# Patient Record
Sex: FEMALE | URBAN - METROPOLITAN AREA
[De-identification: names, ages, dates, MRNs, and addresses within clinical notes are randomized per-mention and may not be internally consistent; named-entity substitution may affect disease eponyms.]

---

## 2017-10-20 ENCOUNTER — COMMUNICATION - HEALTHEAST (OUTPATIENT)
Dept: TELEHEALTH | Facility: CLINIC | Age: 27
End: 2017-10-20

## 2017-10-20 ENCOUNTER — OFFICE VISIT - HEALTHEAST (OUTPATIENT)
Dept: FAMILY MEDICINE | Facility: CLINIC | Age: 27
End: 2017-10-20

## 2017-10-20 DIAGNOSIS — N92.0 MENORRHAGIA: ICD-10-CM

## 2017-10-20 DIAGNOSIS — E05.00 TOXIC DIFFUSE GOITER: ICD-10-CM

## 2017-10-20 ASSESSMENT — MIFFLIN-ST. JEOR: SCORE: 1410.55

## 2017-10-25 ENCOUNTER — COMMUNICATION - HEALTHEAST (OUTPATIENT)
Dept: FAMILY MEDICINE | Facility: CLINIC | Age: 27
End: 2017-10-25

## 2017-11-06 ENCOUNTER — AMBULATORY - HEALTHEAST (OUTPATIENT)
Dept: FAMILY MEDICINE | Facility: CLINIC | Age: 27
End: 2017-11-06

## 2017-11-06 ENCOUNTER — COMMUNICATION - HEALTHEAST (OUTPATIENT)
Dept: FAMILY MEDICINE | Facility: CLINIC | Age: 27
End: 2017-11-06

## 2017-11-06 DIAGNOSIS — E05.00 TOXIC DIFFUSE GOITER: ICD-10-CM

## 2017-12-05 ENCOUNTER — OFFICE VISIT - HEALTHEAST (OUTPATIENT)
Dept: FAMILY MEDICINE | Facility: CLINIC | Age: 27
End: 2017-12-05

## 2017-12-05 DIAGNOSIS — Z01.818 PREOP TESTING: ICD-10-CM

## 2017-12-05 DIAGNOSIS — E05.00 TOXIC DIFFUSE GOITER: ICD-10-CM

## 2017-12-05 DIAGNOSIS — Z01.818 PREOP EXAMINATION: ICD-10-CM

## 2017-12-05 DIAGNOSIS — N94.6 DYSMENORRHEA: ICD-10-CM

## 2017-12-05 DIAGNOSIS — N92.0 MENORRHAGIA: ICD-10-CM

## 2017-12-05 DIAGNOSIS — N84.0 ENDOMETRIAL POLYP: ICD-10-CM

## 2017-12-05 ASSESSMENT — MIFFLIN-ST. JEOR: SCORE: 1448.54

## 2017-12-20 ENCOUNTER — COMMUNICATION - HEALTHEAST (OUTPATIENT)
Dept: FAMILY MEDICINE | Facility: CLINIC | Age: 27
End: 2017-12-20

## 2018-04-04 ENCOUNTER — AMBULATORY - HEALTHEAST (OUTPATIENT)
Dept: LAB | Facility: CLINIC | Age: 28
End: 2018-04-04

## 2018-04-04 ENCOUNTER — AMBULATORY - HEALTHEAST (OUTPATIENT)
Dept: FAMILY MEDICINE | Facility: CLINIC | Age: 28
End: 2018-04-04

## 2018-04-04 DIAGNOSIS — E05.00 TOXIC DIFFUSE GOITER: ICD-10-CM

## 2018-04-04 LAB
T4 FREE SERPL-MCNC: 1.3 NG/DL (ref 0.7–1.8)
TSH SERPL DL<=0.005 MIU/L-ACNC: 1.75 UIU/ML (ref 0.3–5)

## 2018-04-15 ENCOUNTER — COMMUNICATION - HEALTHEAST (OUTPATIENT)
Dept: FAMILY MEDICINE | Facility: CLINIC | Age: 28
End: 2018-04-15

## 2018-04-16 ENCOUNTER — AMBULATORY - HEALTHEAST (OUTPATIENT)
Dept: FAMILY MEDICINE | Facility: CLINIC | Age: 28
End: 2018-04-16

## 2018-04-16 DIAGNOSIS — E05.00 TOXIC DIFFUSE GOITER: ICD-10-CM

## 2018-04-17 ENCOUNTER — AMBULATORY - HEALTHEAST (OUTPATIENT)
Dept: FAMILY MEDICINE | Facility: CLINIC | Age: 28
End: 2018-04-17

## 2018-04-17 DIAGNOSIS — E05.00 TOXIC DIFFUSE GOITER: ICD-10-CM

## 2018-10-17 ENCOUNTER — COMMUNICATION - HEALTHEAST (OUTPATIENT)
Dept: FAMILY MEDICINE | Facility: CLINIC | Age: 28
End: 2018-10-17

## 2018-10-17 ENCOUNTER — AMBULATORY - HEALTHEAST (OUTPATIENT)
Dept: FAMILY MEDICINE | Facility: CLINIC | Age: 28
End: 2018-10-17

## 2018-10-17 DIAGNOSIS — E05.00 TOXIC DIFFUSE GOITER: ICD-10-CM

## 2018-10-22 ENCOUNTER — AMBULATORY - HEALTHEAST (OUTPATIENT)
Dept: LAB | Facility: CLINIC | Age: 28
End: 2018-10-22

## 2018-10-22 DIAGNOSIS — E05.00 TOXIC DIFFUSE GOITER: ICD-10-CM

## 2018-10-22 LAB
T3FREE SERPL-MCNC: 2.1 PG/ML (ref 1.9–3.9)
T4 FREE SERPL-MCNC: 1.3 NG/DL (ref 0.7–1.8)
TSH SERPL DL<=0.005 MIU/L-ACNC: 8.03 UIU/ML (ref 0.3–5)

## 2018-10-23 ENCOUNTER — COMMUNICATION - HEALTHEAST (OUTPATIENT)
Dept: FAMILY MEDICINE | Facility: CLINIC | Age: 28
End: 2018-10-23

## 2018-12-04 ENCOUNTER — COMMUNICATION - HEALTHEAST (OUTPATIENT)
Dept: FAMILY MEDICINE | Facility: CLINIC | Age: 28
End: 2018-12-04

## 2018-12-04 DIAGNOSIS — E05.00 TOXIC DIFFUSE GOITER: ICD-10-CM

## 2018-12-11 ENCOUNTER — AMBULATORY - HEALTHEAST (OUTPATIENT)
Dept: LAB | Facility: CLINIC | Age: 28
End: 2018-12-11

## 2018-12-11 ENCOUNTER — AMBULATORY - HEALTHEAST (OUTPATIENT)
Dept: FAMILY MEDICINE | Facility: CLINIC | Age: 28
End: 2018-12-11

## 2018-12-11 DIAGNOSIS — E05.00 TOXIC DIFFUSE GOITER: ICD-10-CM

## 2018-12-11 LAB
T4 FREE SERPL-MCNC: 1.5 NG/DL (ref 0.7–1.8)
TSH SERPL DL<=0.005 MIU/L-ACNC: 0.13 UIU/ML (ref 0.3–5)

## 2018-12-12 ENCOUNTER — COMMUNICATION - HEALTHEAST (OUTPATIENT)
Dept: FAMILY MEDICINE | Facility: CLINIC | Age: 28
End: 2018-12-12

## 2019-01-11 ENCOUNTER — COMMUNICATION - HEALTHEAST (OUTPATIENT)
Dept: FAMILY MEDICINE | Facility: CLINIC | Age: 29
End: 2019-01-11

## 2019-01-18 ENCOUNTER — COMMUNICATION - HEALTHEAST (OUTPATIENT)
Dept: FAMILY MEDICINE | Facility: CLINIC | Age: 29
End: 2019-01-18

## 2019-01-18 DIAGNOSIS — E05.00 TOXIC DIFFUSE GOITER: ICD-10-CM

## 2019-02-19 ENCOUNTER — OFFICE VISIT - HEALTHEAST (OUTPATIENT)
Dept: FAMILY MEDICINE | Facility: CLINIC | Age: 29
End: 2019-02-19

## 2019-02-19 DIAGNOSIS — E55.9 VITAMIN D DEFICIENCY: ICD-10-CM

## 2019-02-19 DIAGNOSIS — E03.9 HYPOTHYROIDISM, ACQUIRED: ICD-10-CM

## 2019-02-19 DIAGNOSIS — E05.00 TOXIC DIFFUSE GOITER: ICD-10-CM

## 2019-02-19 DIAGNOSIS — Z90.710 S/P HYSTERECTOMY: ICD-10-CM

## 2019-02-19 DIAGNOSIS — Z00.00 ROUTINE GENERAL MEDICAL EXAMINATION AT A HEALTH CARE FACILITY: ICD-10-CM

## 2019-02-19 LAB
ALBUMIN SERPL-MCNC: 4 G/DL (ref 3.5–5)
ALP SERPL-CCNC: 55 U/L (ref 45–120)
ALT SERPL W P-5'-P-CCNC: <9 U/L (ref 0–45)
ANION GAP SERPL CALCULATED.3IONS-SCNC: 9 MMOL/L (ref 5–18)
AST SERPL W P-5'-P-CCNC: 13 U/L (ref 0–40)
BILIRUB SERPL-MCNC: 1.1 MG/DL (ref 0–1)
BUN SERPL-MCNC: 11 MG/DL (ref 8–22)
CALCIUM SERPL-MCNC: 9.6 MG/DL (ref 8.5–10.5)
CHLORIDE BLD-SCNC: 104 MMOL/L (ref 98–107)
CHOLEST SERPL-MCNC: 223 MG/DL
CO2 SERPL-SCNC: 25 MMOL/L (ref 22–31)
CREAT SERPL-MCNC: 0.68 MG/DL (ref 0.6–1.1)
FASTING STATUS PATIENT QL REPORTED: YES
GFR SERPL CREATININE-BSD FRML MDRD: >60 ML/MIN/1.73M2
GLUCOSE BLD-MCNC: 71 MG/DL (ref 70–125)
HDLC SERPL-MCNC: 100 MG/DL
LDLC SERPL CALC-MCNC: 110 MG/DL
POTASSIUM BLD-SCNC: 4.1 MMOL/L (ref 3.5–5)
PROT SERPL-MCNC: 7.1 G/DL (ref 6–8)
SODIUM SERPL-SCNC: 138 MMOL/L (ref 136–145)
T4 FREE SERPL-MCNC: 1.3 NG/DL (ref 0.7–1.8)
TRIGL SERPL-MCNC: 66 MG/DL
TSH SERPL DL<=0.005 MIU/L-ACNC: 1.55 UIU/ML (ref 0.3–5)

## 2019-02-19 ASSESSMENT — MIFFLIN-ST. JEOR: SCORE: 1341.77

## 2019-02-20 LAB
25(OH)D3 SERPL-MCNC: 13.9 NG/ML (ref 30–80)
25(OH)D3 SERPL-MCNC: 13.9 NG/ML (ref 30–80)
HPV SOURCE: NORMAL
HUMAN PAPILLOMA VIRUS 16 DNA: NEGATIVE
HUMAN PAPILLOMA VIRUS 18 DNA: NEGATIVE
HUMAN PAPILLOMA VIRUS FINAL DIAGNOSIS: NORMAL
HUMAN PAPILLOMA VIRUS OTHER HR: NEGATIVE
SPECIMEN DESCRIPTION: NORMAL

## 2019-04-05 ENCOUNTER — COMMUNICATION - HEALTHEAST (OUTPATIENT)
Dept: FAMILY MEDICINE | Facility: CLINIC | Age: 29
End: 2019-04-05

## 2019-04-16 ENCOUNTER — OFFICE VISIT - HEALTHEAST (OUTPATIENT)
Dept: FAMILY MEDICINE | Facility: CLINIC | Age: 29
End: 2019-04-16

## 2019-04-16 DIAGNOSIS — B80 PINWORMS: ICD-10-CM

## 2019-04-16 DIAGNOSIS — N89.8 VAGINAL ITCHING: ICD-10-CM

## 2019-04-16 DIAGNOSIS — L29.0 ANAL ITCHING: ICD-10-CM

## 2019-04-16 LAB
CLUE CELLS: NORMAL
TRICHOMONAS, WET PREP: NORMAL
YEAST, WET PREP: NORMAL

## 2019-04-19 ENCOUNTER — COMMUNICATION - HEALTHEAST (OUTPATIENT)
Dept: FAMILY MEDICINE | Facility: CLINIC | Age: 29
End: 2019-04-19

## 2019-05-02 ENCOUNTER — COMMUNICATION - HEALTHEAST (OUTPATIENT)
Dept: FAMILY MEDICINE | Facility: CLINIC | Age: 29
End: 2019-05-02

## 2019-06-05 ENCOUNTER — COMMUNICATION - HEALTHEAST (OUTPATIENT)
Dept: FAMILY MEDICINE | Facility: CLINIC | Age: 29
End: 2019-06-05

## 2019-06-06 ENCOUNTER — AMBULATORY - HEALTHEAST (OUTPATIENT)
Dept: FAMILY MEDICINE | Facility: CLINIC | Age: 29
End: 2019-06-06

## 2019-06-06 DIAGNOSIS — E03.9 HYPOTHYROIDISM, ACQUIRED: ICD-10-CM

## 2019-06-11 ENCOUNTER — COMMUNICATION - HEALTHEAST (OUTPATIENT)
Dept: FAMILY MEDICINE | Facility: CLINIC | Age: 29
End: 2019-06-11

## 2019-06-12 ENCOUNTER — AMBULATORY - HEALTHEAST (OUTPATIENT)
Dept: FAMILY MEDICINE | Facility: CLINIC | Age: 29
End: 2019-06-12

## 2019-06-12 DIAGNOSIS — E03.9 HYPOTHYROIDISM, ACQUIRED: ICD-10-CM

## 2019-09-02 ENCOUNTER — COMMUNICATION - HEALTHEAST (OUTPATIENT)
Dept: FAMILY MEDICINE | Facility: CLINIC | Age: 29
End: 2019-09-02

## 2019-09-02 DIAGNOSIS — E03.9 HYPOTHYROIDISM, ACQUIRED: ICD-10-CM

## 2019-10-18 ENCOUNTER — COMMUNICATION - HEALTHEAST (OUTPATIENT)
Dept: SCHEDULING | Facility: CLINIC | Age: 29
End: 2019-10-18

## 2019-10-18 DIAGNOSIS — E03.9 HYPOTHYROIDISM, ACQUIRED: ICD-10-CM

## 2019-12-18 ENCOUNTER — OFFICE VISIT - HEALTHEAST (OUTPATIENT)
Dept: FAMILY MEDICINE | Facility: CLINIC | Age: 29
End: 2019-12-18

## 2019-12-18 DIAGNOSIS — E03.9 HYPOTHYROIDISM, ACQUIRED: ICD-10-CM

## 2019-12-18 DIAGNOSIS — E55.9 VITAMIN D DEFICIENCY: ICD-10-CM

## 2019-12-18 LAB
T4 FREE SERPL-MCNC: 1.3 NG/DL (ref 0.7–1.8)
TSH SERPL DL<=0.005 MIU/L-ACNC: 1.27 UIU/ML (ref 0.3–5)

## 2019-12-18 ASSESSMENT — MIFFLIN-ST. JEOR: SCORE: 1373.24

## 2019-12-19 LAB
25(OH)D3 SERPL-MCNC: 33.2 NG/ML (ref 30–80)
25(OH)D3 SERPL-MCNC: 33.2 NG/ML (ref 30–80)

## 2020-01-22 ENCOUNTER — AMBULATORY - HEALTHEAST (OUTPATIENT)
Dept: FAMILY MEDICINE | Facility: CLINIC | Age: 30
End: 2020-01-22

## 2020-01-22 ENCOUNTER — COMMUNICATION - HEALTHEAST (OUTPATIENT)
Dept: FAMILY MEDICINE | Facility: CLINIC | Age: 30
End: 2020-01-22

## 2020-01-22 ENCOUNTER — COMMUNICATION - HEALTHEAST (OUTPATIENT)
Dept: SCHEDULING | Facility: CLINIC | Age: 30
End: 2020-01-22

## 2020-02-19 ENCOUNTER — COMMUNICATION - HEALTHEAST (OUTPATIENT)
Dept: FAMILY MEDICINE | Facility: CLINIC | Age: 30
End: 2020-02-19

## 2020-02-19 DIAGNOSIS — E03.9 HYPOTHYROIDISM, ACQUIRED: ICD-10-CM

## 2020-02-25 ENCOUNTER — AMBULATORY - HEALTHEAST (OUTPATIENT)
Dept: FAMILY MEDICINE | Facility: CLINIC | Age: 30
End: 2020-02-25

## 2020-02-25 DIAGNOSIS — E03.9 HYPOTHYROIDISM, ACQUIRED: ICD-10-CM

## 2020-07-16 ENCOUNTER — COMMUNICATION - HEALTHEAST (OUTPATIENT)
Dept: FAMILY MEDICINE | Facility: CLINIC | Age: 30
End: 2020-07-16

## 2020-08-07 ENCOUNTER — OFFICE VISIT - HEALTHEAST (OUTPATIENT)
Dept: FAMILY MEDICINE | Facility: CLINIC | Age: 30
End: 2020-08-07

## 2020-08-07 DIAGNOSIS — E05.00 TOXIC DIFFUSE GOITER: ICD-10-CM

## 2020-08-07 DIAGNOSIS — E55.9 VITAMIN D DEFICIENCY: ICD-10-CM

## 2020-08-07 DIAGNOSIS — M25.50 MULTIPLE JOINT PAIN: ICD-10-CM

## 2020-08-07 DIAGNOSIS — Z90.710 S/P HYSTERECTOMY: ICD-10-CM

## 2020-08-07 DIAGNOSIS — Z11.4 SCREENING FOR HUMAN IMMUNODEFICIENCY VIRUS WITHOUT PRESENCE OF RISK FACTORS: ICD-10-CM

## 2020-08-07 DIAGNOSIS — Z00.00 ROUTINE GENERAL MEDICAL EXAMINATION AT A HEALTH CARE FACILITY: ICD-10-CM

## 2020-08-07 DIAGNOSIS — Z23 IMMUNIZATION DUE: ICD-10-CM

## 2020-08-07 LAB
ALBUMIN SERPL-MCNC: 4.1 G/DL (ref 3.5–5)
ALP SERPL-CCNC: 77 U/L (ref 45–120)
ALT SERPL W P-5'-P-CCNC: 16 U/L (ref 0–45)
ANION GAP SERPL CALCULATED.3IONS-SCNC: 11 MMOL/L (ref 5–18)
AST SERPL W P-5'-P-CCNC: 17 U/L (ref 0–40)
BILIRUB SERPL-MCNC: 0.9 MG/DL (ref 0–1)
BUN SERPL-MCNC: 8 MG/DL (ref 8–22)
CALCIUM SERPL-MCNC: 9.5 MG/DL (ref 8.5–10.5)
CHLORIDE BLD-SCNC: 103 MMOL/L (ref 98–107)
CO2 SERPL-SCNC: 23 MMOL/L (ref 22–31)
CREAT SERPL-MCNC: 0.7 MG/DL (ref 0.6–1.1)
GFR SERPL CREATININE-BSD FRML MDRD: >60 ML/MIN/1.73M2
GLUCOSE BLD-MCNC: 84 MG/DL (ref 70–125)
HIV 1+2 AB+HIV1 P24 AG SERPL QL IA: NEGATIVE
POTASSIUM BLD-SCNC: 4.2 MMOL/L (ref 3.5–5)
PROT SERPL-MCNC: 7.6 G/DL (ref 6–8)
RHEUMATOID FACT SERPL-ACNC: <15 IU/ML (ref 0–30)
SODIUM SERPL-SCNC: 137 MMOL/L (ref 136–145)
T4 FREE SERPL-MCNC: 0.9 NG/DL (ref 0.7–1.8)
TSH SERPL DL<=0.005 MIU/L-ACNC: 4.81 UIU/ML (ref 0.3–5)

## 2020-08-07 ASSESSMENT — MIFFLIN-ST. JEOR: SCORE: 1454.88

## 2020-08-10 LAB
25(OH)D3 SERPL-MCNC: 21.7 NG/ML (ref 30–80)
25(OH)D3 SERPL-MCNC: 21.7 NG/ML (ref 30–80)

## 2020-08-12 LAB — ANA SER QL: 0.7 U

## 2020-08-13 ENCOUNTER — COMMUNICATION - HEALTHEAST (OUTPATIENT)
Dept: FAMILY MEDICINE | Facility: CLINIC | Age: 30
End: 2020-08-13

## 2020-08-13 DIAGNOSIS — E03.9 HYPOTHYROIDISM, ACQUIRED: ICD-10-CM

## 2020-09-04 ENCOUNTER — COMMUNICATION - HEALTHEAST (OUTPATIENT)
Dept: FAMILY MEDICINE | Facility: CLINIC | Age: 30
End: 2020-09-04

## 2020-09-04 DIAGNOSIS — E03.9 HYPOTHYROIDISM, ACQUIRED: ICD-10-CM

## 2020-09-30 ENCOUNTER — RECORDS - HEALTHEAST (OUTPATIENT)
Dept: ADMINISTRATIVE | Facility: OTHER | Age: 30
End: 2020-09-30

## 2020-10-04 ENCOUNTER — COMMUNICATION - HEALTHEAST (OUTPATIENT)
Dept: FAMILY MEDICINE | Facility: CLINIC | Age: 30
End: 2020-10-04

## 2020-10-04 DIAGNOSIS — E03.9 HYPOTHYROIDISM, ACQUIRED: ICD-10-CM

## 2021-01-11 ENCOUNTER — COMMUNICATION - HEALTHEAST (OUTPATIENT)
Dept: FAMILY MEDICINE | Facility: CLINIC | Age: 31
End: 2021-01-11

## 2021-01-11 ENCOUNTER — AMBULATORY - HEALTHEAST (OUTPATIENT)
Dept: FAMILY MEDICINE | Facility: CLINIC | Age: 31
End: 2021-01-11

## 2021-01-11 DIAGNOSIS — N94.6 DYSMENORRHEA: ICD-10-CM

## 2021-01-11 DIAGNOSIS — E03.9 HYPOTHYROIDISM, ACQUIRED: ICD-10-CM

## 2021-03-01 ENCOUNTER — AMBULATORY - HEALTHEAST (OUTPATIENT)
Dept: NURSING | Facility: CLINIC | Age: 31
End: 2021-03-01

## 2021-03-22 ENCOUNTER — AMBULATORY - HEALTHEAST (OUTPATIENT)
Dept: NURSING | Facility: CLINIC | Age: 31
End: 2021-03-22

## 2021-04-11 ENCOUNTER — COMMUNICATION - HEALTHEAST (OUTPATIENT)
Dept: FAMILY MEDICINE | Facility: CLINIC | Age: 31
End: 2021-04-11

## 2021-04-11 DIAGNOSIS — N94.6 DYSMENORRHEA: ICD-10-CM

## 2021-04-11 DIAGNOSIS — E03.9 HYPOTHYROIDISM, ACQUIRED: ICD-10-CM

## 2021-04-19 ENCOUNTER — COMMUNICATION - HEALTHEAST (OUTPATIENT)
Dept: FAMILY MEDICINE | Facility: CLINIC | Age: 31
End: 2021-04-19

## 2021-04-19 DIAGNOSIS — N94.6 DYSMENORRHEA: ICD-10-CM

## 2021-04-19 DIAGNOSIS — E03.9 HYPOTHYROIDISM, ACQUIRED: ICD-10-CM

## 2021-04-21 ENCOUNTER — COMMUNICATION - HEALTHEAST (OUTPATIENT)
Dept: FAMILY MEDICINE | Facility: CLINIC | Age: 31
End: 2021-04-21

## 2021-04-21 DIAGNOSIS — E03.9 HYPOTHYROIDISM, ACQUIRED: ICD-10-CM

## 2021-04-29 ENCOUNTER — COMMUNICATION - HEALTHEAST (OUTPATIENT)
Dept: FAMILY MEDICINE | Facility: CLINIC | Age: 31
End: 2021-04-29

## 2021-04-29 DIAGNOSIS — E03.9 HYPOTHYROIDISM, ACQUIRED: ICD-10-CM

## 2021-04-29 DIAGNOSIS — N94.6 DYSMENORRHEA: ICD-10-CM

## 2021-05-13 ENCOUNTER — COMMUNICATION - HEALTHEAST (OUTPATIENT)
Dept: FAMILY MEDICINE | Facility: CLINIC | Age: 31
End: 2021-05-13

## 2021-05-27 NOTE — PROGRESS NOTES
"ASSESSMENT/PLAN:  1. Vaginal itching  Wet Prep, Vaginal    fluconazole (DIFLUCAN) 150 MG tablet   2. Anal itching  mebendazole (VERMOX) 100 mg chewable tablet   3. Pinworms  mebendazole (VERMOX) 100 mg chewable tablet       This is a 29 yo female with :  1.  Anal itching - exposure to pinworms ( has pinworms).  Patient has especially nocturnal itching.  Will treat presumptively with Mebendazole.  If no improvement in symptoms, then will need to consider tape test.    2.  Vaginal itching - wet prep is negative today; however, does have symptoms of yeast vaginitis - will treat with Fluconazole.    Return in about 6 months (around 10/16/2019).      There are no discontinued medications.  There are no Patient Instructions on file for this visit.    Chief Complaint:  Chief Complaint   Patient presents with     Itchiness     groin and anus area, x couple of weeks       HPI:   Beverly Wilder is a 28 y.o. female c/o  Started having itching - vaginal - thought it might be yeast - thought it might be soaps  Now, waking her up in middle of night - and \"going crazy\"  Started about 3 weeks ago  Gets lots of pimples - but now, more dry and irritated      PMH:   Patient Active Problem List    Diagnosis Date Noted     S/P hysterectomy 02/19/2019     Menorrhagia 10/29/2017     Endometrial polyp 02/10/2017     Dysmenorrhea 02/10/2017     Vitamin D deficiency 09/10/2016     Hypothyroidism, acquired 09/09/2016     Graves' Disease      History reviewed. No pertinent past medical history.  History reviewed. No pertinent surgical history.  Social History     Socioeconomic History     Marital status:      Spouse name: Not on file     Number of children: Not on file     Years of education: Not on file     Highest education level: Not on file   Occupational History     Not on file   Social Needs     Financial resource strain: Not on file     Food insecurity:     Worry: Not on file     Inability: Not on file     Transportation " needs:     Medical: Not on file     Non-medical: Not on file   Tobacco Use     Smoking status: Never Smoker     Smokeless tobacco: Never Used     Tobacco comment: no second hand smoke exposure   Substance and Sexual Activity     Alcohol use: Not on file     Drug use: Not on file     Sexual activity: Not on file   Lifestyle     Physical activity:     Days per week: Not on file     Minutes per session: Not on file     Stress: Not on file   Relationships     Social connections:     Talks on phone: Not on file     Gets together: Not on file     Attends Confucianist service: Not on file     Active member of club or organization: Not on file     Attends meetings of clubs or organizations: Not on file     Relationship status: Not on file     Intimate partner violence:     Fear of current or ex partner: Not on file     Emotionally abused: Not on file     Physically abused: Not on file     Forced sexual activity: Not on file   Other Topics Concern     Not on file   Social History Narrative     Not on file     History reviewed. No pertinent family history.    Meds:  No current outpatient medications on file.    Allergies:  Allergies   Allergen Reactions     Mold Other (See Comments)       ROS:  Pertinent positives as noted in HPI; otherwise 12 point ROS negative.      Physical Exam:  EXAM:  /76 (Patient Site: Left Arm, Patient Position: Sitting, Cuff Size: Adult Regular)   Pulse 67   Temp 98.1  F (36.7  C) (Oral)   Resp 20   Wt 151 lb 7 oz (68.7 kg)   LMP 12/01/2017 (Exact Date)   SpO2 98%   Breastfeeding? No   BMI 27.04 kg/m     Gen:  NAD, appears well, well-hydrated  HEENT:  TMs nl, oropharynx benign, nasal mucosa nl, conjunctiva clear  Neck:  Supple, no adenopathy, no thyromegaly, no carotid bruits, no JVD  Lungs:  Clear to auscultation bilaterally  Cor:  RRR no murmur  Abd:  Soft, nontender, BS+, no masses, no guarding or rebound, no HSM  :  White vaginal discharge; irritation at anal verge, small white  debris at anal verge (?worms)  Extr:  Neg.  Neuro:  No asymmetry  Skin:  Warm/dry        Results:  Results for orders placed or performed in visit on 04/16/19   Wet Prep, Vaginal   Result Value Ref Range    Yeast Result No yeast seen No yeast seen    Trichomonas No Trichomonas seen No Trichomonas seen    Clue Cells, Wet Prep No Clue cells seen No Clue cells seen

## 2021-05-28 NOTE — TELEPHONE ENCOUNTER
Medication Question or Clarification  Who is calling: Pharmacy: [facility]  What medication are you calling about? (include dose and sig)   mebendazole (VERMOX) 100 mg chewable tablet 2 tablet 0 4/16/2019 4/16/2019 --   Sig - Route: Chew 1 tablet (100 mg total) once for 1 dose. And repeat dose in 2 weeks. - Oral   Sent to pharmacy as: mebendazole (VERMOX) 100 mg chewable tablet       Who prescribed the medication?: Dede Dawson MD  What is your question/concern?: Pharmacist is requesting to change this medication to  Emverm /mebendazole 100 mg   Sig - Route: Chew 1 tablet (100 mg total) once for 1 dose. And repeat dose in 2 weeks. - Oral   stated the ordered medication is not available   Pharmacy: [facility]   Okay to leave a detailed message?: Yes  Site CMT - Please call the pharmacy to obtain any additional needed information.

## 2021-05-29 NOTE — TELEPHONE ENCOUNTER
Dr. Jacobo, appears there was some confusion refilling this patient's thyroid medication. Per your chart note from last annual visit, levothyroxine 100 mcg 5 times a week and 88 mcg twice a week. Dr. Kirkland refilled 88 mcg only, please advise

## 2021-05-29 NOTE — TELEPHONE ENCOUNTER
DOD,   Medication is not on Current Med List. Are you willing to prescribe or okay to wait for PCP?

## 2021-05-30 ENCOUNTER — RECORDS - HEALTHEAST (OUTPATIENT)
Dept: ADMINISTRATIVE | Facility: CLINIC | Age: 31
End: 2021-05-30

## 2021-05-31 ENCOUNTER — RECORDS - HEALTHEAST (OUTPATIENT)
Dept: ADMINISTRATIVE | Facility: CLINIC | Age: 31
End: 2021-05-31

## 2021-05-31 VITALS — WEIGHT: 167 LBS | HEIGHT: 64 IN | BODY MASS INDEX: 28.51 KG/M2

## 2021-05-31 VITALS — WEIGHT: 163 LBS | BODY MASS INDEX: 28.88 KG/M2 | HEIGHT: 63 IN

## 2021-05-31 NOTE — TELEPHONE ENCOUNTER
Refill Approved    Rx renewed per Medication Renewal Policy. Medication was last renewed on 6/6/19.    Clau Barth, [facility] [place] [facility] Triage/Med Refill 9/2/2019     Requested Prescriptions   Pending Prescriptions Disp Refills     levothyroxine (SYNTHROID, LEVOTHROID) 88 MCG tablet [Pharmacy Med Name: LEVOTHYROXINE 88 MCG TABLET] 90 tablet 0     Sig: TAKE 1 TABLET BY MOUTH EVERY DAY       Thyroid Hormones Protocol Passed - 9/2/2019  1:03 AM        Passed - Provider visit in past 12 months or next 3 months     Last office visit with prescriber/PCP: Visit date not found OR same dept: 4/16/2019 Dede Dawson MD OR same specialty: 4/16/2019 Dede Dawson MD  Last physical: Visit date not found Last MTM visit: Visit date not found   Next visit within 3 mo: Visit date not found  Next physical within 3 mo: Visit date not found  Prescriber OR PCP: Abel Kirkland MD  Last diagnosis associated with med order: 1. Hypothyroidism, acquired  - levothyroxine (SYNTHROID, LEVOTHROID) 88 MCG tablet [Pharmacy Med Name: LEVOTHYROXINE 88 MCG TABLET]; TAKE 1 TABLET BY MOUTH EVERY DAY  Dispense: 90 tablet; Refill: 0    If protocol passes may refill for 12 months if within 3 months of last provider visit (or a total of 15 months).             Passed - TSH on file in past 12 months for patient age 12 & older     TSH   Date Value Ref Range Status   02/19/2019 1.55 0.30 - 5.00 uIU/mL Final

## 2021-06-01 ENCOUNTER — RECORDS - HEALTHEAST (OUTPATIENT)
Dept: ADMINISTRATIVE | Facility: CLINIC | Age: 31
End: 2021-06-01

## 2021-06-02 VITALS — WEIGHT: 151.44 LBS | BODY MASS INDEX: 27.04 KG/M2

## 2021-06-02 VITALS — HEIGHT: 63 IN | BODY MASS INDEX: 26.23 KG/M2 | WEIGHT: 148.06 LBS

## 2021-06-02 NOTE — TELEPHONE ENCOUNTER
Medication replacements need to be address by a provider.    RN cannot approve Refill Request    RN can NOT refill this medication med is not covered by policy/route to provider. Last office visit: 4/16/2019 Dede Dawson MD Last Physical: 2/19/2019 Last MTM visit: Visit date not found Last visit same specialty: Visit date not found.  Next visit within 3 mo: Visit date not found  Next physical within 3 mo: Visit date not found      Estelle Cali [facility] Triage/Med Refill 10/23/2019    There are no medications in this encounter.          Estelle Cali RN, [facility] Nurse Triage/Med Refills RN

## 2021-06-02 NOTE — TELEPHONE ENCOUNTER
Medication Request  Medication name:   levothyroxine (SYNTHROID, LEVOTHROID) 88 MCG tablet 90 tablet 1 9/2/2019     Sig - Route: Take 1 tablet (88 mcg total) by mouth daily. - Oral    Sent to pharmacy as: levothyroxine (SYNTHROID, LEVOTHROID) 88 MCG tablet    E-Prescribing Status: Receipt confirmed by pharmacy (9/2/2019  1:07 PM CDT)        Pharmacy Name and Location: [facility] # [id number]  Reason for request: Patient is traveling forgot medication is asking for a renewal.  When did you use medication last?:  unknown  Patient offered appointment:  MAHESH  Okay to leave a detailed message: yes

## 2021-06-02 NOTE — TELEPHONE ENCOUNTER
This message is 5 days old - not sure if it's still relevant or not.  This patient takes 2 different doses of Levothyroxine.  This was set up for just the 88 mcg tablet.  I did okay #30 to the pharmacy in [place].  Perhaps we could contact her and find out if she still wanted it sent there or if she needs the other dose as well?  We can always cancel the rx I've sent.

## 2021-06-04 VITALS
DIASTOLIC BLOOD PRESSURE: 82 MMHG | RESPIRATION RATE: 18 BRPM | BODY MASS INDEX: 27.46 KG/M2 | HEIGHT: 63 IN | HEART RATE: 66 BPM | SYSTOLIC BLOOD PRESSURE: 124 MMHG | WEIGHT: 155 LBS

## 2021-06-04 VITALS
HEIGHT: 63 IN | RESPIRATION RATE: 18 BRPM | BODY MASS INDEX: 30.65 KG/M2 | TEMPERATURE: 97.7 F | SYSTOLIC BLOOD PRESSURE: 124 MMHG | HEART RATE: 74 BPM | DIASTOLIC BLOOD PRESSURE: 79 MMHG | WEIGHT: 173 LBS

## 2021-06-04 NOTE — PROGRESS NOTES
"ASSESSMENT/PLAN:  1. Hypothyroidism, acquired  Thyroid Stimulating Hormone (TSH)    T4, Free   2. Vitamin D deficiency  Vitamin D, Total (25-Hydroxy)       This is a 28 yo female with:  1.  Hypothyroidism - currently complains of significant anxiety/irritability/crying easily.  She is certain this is relted to her thyroid being \"off\".  We discussed that this may be more of underlying anxiety - she prefers to test her thyroid, hoping this is an easy fix.  TSH ordered.  2.  Vitamin D deficiency - low Vitamin D could contribute to low mood  Return in about 3 months (around 3/18/2020) for Annual physical.      There are no discontinued medications.  There are no Patient Instructions on file for this visit.    Chief Complaint:  Chief Complaint   Patient presents with     Thyroid Problem     chk thyroid-- feeling down lately        HPI:   Beverly Wilder is a 29 y.o. 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 No pertinent past medical history.  History reviewed. No pertinent surgical history.  Social History     Socioeconomic History     Marital status:      Spouse name: Not on file     Number of children: Not on file     Years of education: Not on file     Highest education level: Not on file   Occupational History     Not on file   Social Needs     Financial resource strain: Not on file     Food insecurity:     Worry: Not on file     Inability: Not on file     " "Transportation needs:     Medical: Not on file     Non-medical: Not on file   Tobacco Use     Smoking status: Never Smoker     Smokeless tobacco: Never Used     Tobacco comment: no second hand smoke exposure   Substance and Sexual Activity     Alcohol use: Not on file     Drug use: Not on file     Sexual activity: Not on file   Lifestyle     Physical activity:     Days per week: Not on file     Minutes per session: Not on file     Stress: Not on file   Relationships     Social connections:     Talks on phone: Not on file     Gets together: Not on file     Attends Restorationist service: Not on file     Active member of club or organization: Not on file     Attends meetings of clubs or organizations: Not on file     Relationship status: Not on file     Intimate partner violence:     Fear of current or ex partner: Not on file     Emotionally abused: Not on file     Physically abused: Not on file     Forced sexual activity: Not on file   Other Topics Concern     Not on file   Social History Narrative     Not on file     History reviewed. No pertinent family history.    Meds:    Current Outpatient Medications:      levothyroxine (SYNTHROID, LEVOTHROID) 100 MCG tablet, Take 1 tablet (100 mcg total) by mouth every other day. Alternate with 88 mcg tablets., Disp: 90 tablet, Rfl: 1     levothyroxine (SYNTHROID, LEVOTHROID) 88 MCG tablet, Take 1 tablet (88 mcg total) by mouth daily., Disp: 30 tablet, Rfl: 0    Allergies:  Allergies   Allergen Reactions     Mold Other (See Comments)       ROS:  Pertinent positives as noted in HPI; otherwise 12 point ROS negative.      Physical Exam:  EXAM:  /82 (Patient Site: Left Arm, Patient Position: Sitting, Cuff Size: Adult Regular)   Pulse 66   Resp 18   Ht 5' 2.75\" (1.594 m)   Wt 155 lb (70.3 kg)   LMP 12/01/2017 (Exact Date)   BMI 27.68 kg/m     Gen:  NAD, appears well, well-hydrated, emotionally labile  HEENT:  TMs nl, oropharynx benign, nasal mucosa nl, conjunctiva " clear  Neck:  Supple, no adenopathy, no thyromegaly, no carotid bruits, no JVD  Lungs:  Clear to auscultation bilaterally  Cor:  RRR no murmur  Abd:  Soft, nontender, BS+, no masses, no guarding or rebound, no HSM  Extr:  Neg.  Neuro:  No asymmetry  Skin:  Warm/dry        Results:  Results for orders placed or performed in visit on 12/18/19   Vitamin D, Total (25-Hydroxy)   Result Value Ref Range    Vitamin D, Total (25-Hydroxy) 33.2 30.0 - 80.0 ng/mL   Thyroid Stimulating Hormone (TSH)   Result Value Ref Range    TSH 1.27 0.30 - 5.00 uIU/mL   T4, Free   Result Value Ref Range    Free T4 1.3 0.7 - 1.8 ng/dL

## 2021-06-05 NOTE — TELEPHONE ENCOUNTER
Triage call:   Just got off the phone with Suicide hotline-   Feels really down lately- comes in waves - right now she verbalizes that she is very down   Last night she felt like death was the only way she would feel better   - scared her  She would like to see her PCP   Therapist hasn't agreed to see her  Patient states that she is safe at home-  and dog are home with her. She declines any current danger to herself. Contracts for safety     triaged to be seen today in the office today- reviewed additional care advice with patient and she verbalizes understanding. Patient warm transferred to scheduling for appointment. Patient offered appointments this afternoon but she was looking to be seen sooner. She indicates that she prefers to go to a C. She would like to see PCP as well- assisted her in scheduling an appointment for Monday @ 8:15 am     Georgina Hu RN [facility] Triage/Med Refill 1/22/2020 9:19 AM      Reason for Disposition    Sometimes has thoughts of suicide    Protocols used: DEPRESSION-A-OH

## 2021-06-09 NOTE — TELEPHONE ENCOUNTER
Patient has an appointment   Next Appt  With Family Medicine (Dede Dawson MD)  08/07/2020 at 8:00 AM

## 2021-06-10 NOTE — PROGRESS NOTES
Assessment:      Healthy female exam.    1. Routine general medical examination at a health care facility     2. Immunization due  Tdap vaccine,  6yo or older,  IM   3. Graves' Disease  Thyroid Stimulating Hormone (TSH)    T4, Free    Comprehensive Metabolic Panel   4. S/P hysterectomy     5. Vitamin D deficiency  Vitamin D, Total (25-Hydroxy)   6. Screening for human immunodeficiency virus without presence of risk factors  HIV Antigen/Antibody Screening [facility]   7. Multiple joint pain  Rheumatoid Factor Quant    Antinuclear Antibodies Screen ([facility])          Plan:      This is a 29 yo female here for physical exam:  1.  Graves disease - currently on Levothyroxine - will check labs, adjust as necessary  2.  S/p hysterectomy - does not need pap smear at this time  3.  Vitamin D deficiency - takes supplements - check levels  4.  Multiple joint pain - some family history of inflammatory arthritis - will check labs  5.  Health Maintenance - due for tetanus booster - given; discussed recommendation for HIV screening - ordered        Chief Complaint:  Chief Complaint   Patient presents with     Annual Exam     discuss about vaginal cyst     hands and feet pain     concern about having arthritis since family members have it     possible allergies       HPI:   Beverly Wilder is a 30 y.o. female c/o  pruritus ani  Vaginal cyst -   Doing okay - stressed currently by sister's severe illness (had perforated bowel at time of hysterectomy - led to sepsis and now has had 3 limbs amputated)    PMH:   Patient Active Problem List    Diagnosis Date Noted     S/P hysterectomy 02/19/2019     Menorrhagia 10/29/2017     Endometrial polyp 02/10/2017     Dysmenorrhea 02/10/2017     Vitamin D deficiency 09/10/2016     Hypothyroidism, acquired 09/09/2016     Graves' Disease      History reviewed. No pertinent past medical history.  History reviewed. No pertinent surgical history.  Social History     Socioeconomic History     Marital status:       Spouse name: Not on file     Number of children: Not on file     Years of education: Not on file     Highest education level: Not on file   Occupational History     Not on file   Social Needs     Financial resource strain: Not on file     Food insecurity     Worry: Not on file     Inability: Not on file     Transportation needs     Medical: Not on file     Non-medical: Not on file   Tobacco Use     Smoking status: Never Smoker     Smokeless tobacco: Never Used     Tobacco comment: no second hand smoke exposure   Substance and Sexual Activity     Alcohol use: Not on file     Drug use: Not on file     Sexual activity: Not on file   Lifestyle     Physical activity     Days per week: Not on file     Minutes per session: Not on file     Stress: Not on file   Relationships     Social connections     Talks on phone: Not on file     Gets together: Not on file     Attends [employer] service: Not on file     Active member of club or organization: Not on file     Attends meetings of clubs or organizations: Not on file     Relationship status: Not on file     Intimate partner violence     Fear of current or ex partner: Not on file     Emotionally abused: Not on file     Physically abused: Not on file     Forced sexual activity: Not on file   Other Topics Concern     Not on file   Social History Narrative     Not on file     History reviewed. No pertinent family history.    Meds:  Current Outpatient Medications on File Prior to Visit   Medication Sig Dispense Refill     FLUoxetine (PROZAC) 20 MG capsule Take 20 mg by mouth.       levothyroxine (SYNTHROID, LEVOTHROID) 88 MCG tablet Take 1 tablet (88 mcg total) by mouth daily. 30 tablet 0     No current facility-administered medications on file prior to visit.          Healthy Habits:   Regular Exercise: No  Sunscreen Use: Yes  Healthy Diet: Yes  Dental Visits Regularly: Yes  Seat Belt: Yes  Sexually active: Yes  Self Breast Exam Monthly:Yes        Immunization History    Administered Date(s) Administered     Tdap 01/25/2010, 08/07/2020     Immunization status: reviewed.    No exam data present    Gynecologic History  Patient's last menstrual period was 12/01/2017 (exact date).  Contraception: status post hysterectomy  Last Pap: 2/2019. Results were: normal  Last mammogram: na. Results were: na      OB History   No obstetric history on file.       Current Outpatient Medications   Medication Sig Dispense Refill     FLUoxetine (PROZAC) 20 MG capsule Take 20 mg by mouth.       levothyroxine (SYNTHROID, LEVOTHROID) 88 MCG tablet Take 1 tablet (88 mcg total) by mouth daily. 30 tablet 0     levothyroxine (SYNTHROID, LEVOTHROID) 100 MCG tablet Take 1 tablet (100 mcg total) by mouth every other day. Alternate with 88 mcg tablets. 90 tablet 1     No current facility-administered medications for this visit.      History reviewed. No pertinent past medical history.  History reviewed. No pertinent surgical history.  Mold  History reviewed. No pertinent family history.  Social History     Socioeconomic History     Marital status:      Spouse name: Not on file     Number of children: Not on file     Years of education: Not on file     Highest education level: Not on file   Occupational History     Not on file   Social Needs     Financial resource strain: Not on file     Food insecurity     Worry: Not on file     Inability: Not on file     Transportation needs     Medical: Not on file     Non-medical: Not on file   Tobacco Use     Smoking status: Never Smoker     Smokeless tobacco: Never Used     Tobacco comment: no second hand smoke exposure   Substance and Sexual Activity     Alcohol use: Not on file     Drug use: Not on file     Sexual activity: Not on file   Lifestyle     Physical activity     Days per week: Not on file     Minutes per session: Not on file     Stress: Not on file   Relationships     Social connections     Talks on phone: Not on file     Gets together: Not on file      "Attends Rastafarian service: Not on file     Active member of club or organization: Not on file     Attends meetings of clubs or organizations: Not on file     Relationship status: Not on file     Intimate partner violence     Fear of current or ex partner: Not on file     Emotionally abused: Not on file     Physically abused: Not on file     Forced sexual activity: Not on file   Other Topics Concern     Not on file   Social History Narrative     Not on file       Review of Systems  Review of Systems     Pertinent positives as noted in HPI; otherwise 12 point ROS negative.        Objective:         Vitals:    08/07/20 0801   BP: 124/79   Pulse: 74   Resp: 18   Temp: 97.7  F (36.5  C)   TempSrc: Tympanic   Weight: 173 lb (78.5 kg)   Height: 5' 2.75\" (1.594 m)     Body mass index is 30.89 kg/m .    Physical  Physical Exam    EXAM:  /79 (Patient Site: Right Arm, Patient Position: Sitting, Cuff Size: Adult Regular)   Pulse 74   Temp 97.7  F (36.5  C) (Tympanic)   Resp 18   Ht 5' 2.75\" (1.594 m)   Wt 173 lb (78.5 kg)   LMP 12/01/2017 (Exact Date)   BMI 30.89 kg/m     Gen:  NAD, appears well, well-hydrated  HEENT:  TMs nl, oropharynx benign, nasal mucosa nl, conjunctiva clear  Neck:  Supple, no adenopathy, no thyromegaly, no carotid bruits, no JVD  Lungs:  Clear to auscultation bilaterally  Cor:  RRR no murmur  Abd:  Soft, nontender, BS+, no masses, no guarding or rebound, no HSM  :  No lesions at perianal region  Extr:  Neg.  Neuro:  No asymmetry  Skin:  Warm/dry        "

## 2021-06-12 NOTE — TELEPHONE ENCOUNTER
Refill Approved    Rx renewed per Medication Renewal Policy. Medication was last renewed on 9/4/20.    Kristal Hein, [facility] Triage/Med Refill 10/6/2020     Requested Prescriptions   Pending Prescriptions Disp Refills     levothyroxine (SYNTHROID, LEVOTHROID) 88 MCG tablet [Pharmacy Med Name: LEVOTHYROXINE 88 MCG TABLET] 30 tablet 0     Sig: TAKE 1 TABLET BY MOUTH EVERY DAY       Thyroid Hormones Protocol Passed - 10/4/2020  9:14 AM        Passed - Provider visit in past 12 months or next 3 months     Last office visit with prescriber/PCP: 12/18/2019 Dede Dawson MD OR same dept: 12/18/2019 Dede Dawson MD OR same specialty: 12/18/2019 Dede Dawson MD  Last physical: 8/7/2020 Last MTM visit: Visit date not found   Next visit within 3 mo: Visit date not found  Next physical within 3 mo: Visit date not found  Prescriber OR PCP: Dede Dawson MD  Last diagnosis associated with med order: 1. Hypothyroidism, acquired  - levothyroxine (SYNTHROID, LEVOTHROID) 88 MCG tablet [Pharmacy Med Name: LEVOTHYROXINE 88 MCG TABLET]; TAKE 1 TABLET BY MOUTH EVERY DAY  Dispense: 30 tablet; Refill: 0    If protocol passes may refill for 12 months if within 3 months of last provider visit (or a total of 15 months).             Passed - TSH on file in past 12 months for patient age 12 & older     TSH   Date Value Ref Range Status   08/07/2020 4.81 0.30 - 5.00 uIU/mL Final

## 2021-06-13 NOTE — PROGRESS NOTES
"ASSESSMENT/PLAN:  1. Graves' Disease  Thyroid Stimulating Hormone (TSH)    T4, Free    T3 (Triiodothyronine), Free    Thyroglobulin Antibody   2. Menorrhagia         This is a 28 yo female with:  1.  Graves' disease - currently on levothyroxine replacement therapy.  Has had a lot of adjustments in dosing over last several months - desires some stability .. Will recheck labs and determine if needs change in medications  2.  Menorrhagia - patient has known polyp, probable uterine fibroid - menstrual cramps and bleeding which interfered in usual daily activities.  Has not ever wanted children and  has vasectomy.  Most recently, patient has started thinking she'd like to have children.  Has talked to gynecologist - was told to get a second opinion.  We have discussed this - patient needs to make decision about her desire to have children.  If the answer is no, then proceeding with surgical options may be appropriate.  If the answer is yes, then proceed with workup as outlined.    Total of 30 minutes was spent on this visit, well over 50% spent discussing OB/gyn concerns.      There are no discontinued medications.  There are no Patient Instructions on file for this visit.    Chief Complaint:  Chief Complaint   Patient presents with     thyroid check     discuss hysterectomy       HPI:   Beverly Wilder is a 27 y.o. female c/o  Hyperthyroidism - was in [place] x 4 years  Labs were never stable -   Had to change about every 6 months  Then, because of climate - iodine in air from sea -   It was checked a few times since being back - has changed doses 2-3 times since being back -   Last time, levels were good - so kept it at 88 mcg, but patient didn't feel good  Feeling \"high\" now, double vision  Went to eye doctor - \"double vision is always thyroid at your age\"    Wants hysterectomy   got vasectomy  But then, decided to do reversal  Patient went to gyn for heavy bleeding - ultrasound showed polyp, fibroid " "-  Goes through heavy super max tampons with periods  Cramping, vomiting, diarrhea -       PMH:   Patient Active Problem List    Diagnosis Date Noted     Menorrhagia 10/29/2017     Graves' Disease      No past medical history on file.  No past surgical history on file.  Social History     Social History     Marital status:      Spouse name: N/A     Number of children: N/A     Years of education: N/A     Occupational History     Not on file.     Social History Main Topics     Smoking status: Never Smoker     Smokeless tobacco: Never Used      Comment: no second hand smoke exposure     Alcohol use Not on file     Drug use: Not on file     Sexual activity: Not on file     Other Topics Concern     Not on file     Social History Narrative     No narrative on file       Meds:    Current Outpatient Prescriptions:      levothyroxine (SYNTHROID, LEVOTHROID) 88 MCG tablet, TAKE 1 TABLET BY MOUTH ONCE DAILY., Disp: , Rfl: 2    Allergies:  No Known Allergies    ROS:  Pertinent positives as noted in HPI; otherwise 12 point ROS negative.      Physical Exam:  EXAM:  /70 (Patient Site: Left Arm, Patient Position: Sitting, Cuff Size: Adult Regular)  Pulse 80  Ht 5' 2.5\" (1.588 m)  Wt 163 lb (73.9 kg)  LMP 10/04/2017 (Exact Date)  Breastfeeding? No  BMI 29.34 kg/m2   Gen:  NAD, appears well, well-hydrated  HEENT:  TMs nl, oropharynx benign, nasal mucosa nl, conjunctiva clear  Neck:  Supple, no adenopathy, no thyromegaly, no carotid bruits, no JVD  Lungs:  Clear to auscultation bilaterally  Cor:  RRR no murmur  Abd:  Soft, nontender, BS+, no masses, no guarding or rebound, no HSM  Extr:  Neg.  Neuro:  No asymmetry  Skin:  Warm/dry        Results:  Results for orders placed or performed in visit on 10/20/17   Thyroid Stimulating Hormone (TSH)   Result Value Ref Range    TSH 6.22 (H) 0.30 - 5.00 uIU/mL   T4, Free   Result Value Ref Range    Free T4 1.1 0.7 - 1.8 ng/dL   T3 (Triiodothyronine), Free   Result Value Ref " Range    T3, Free 2.2 1.9 - 3.9 pg/mL   Thyroglobulin Antibody   Result Value Ref Range    Thyroglobulin Antibody, Serum <1.8 <4.0 IU/mL

## 2021-06-14 NOTE — PROGRESS NOTES
Assessment/Plan:      Visit for Preoperative Exam.    1. Preop examination     2. Preop testing  Hemoglobin    Thyroid Stimulating Hormone (TSH)    Pregnancy (Beta-hCG, Qual), Urine   3. Menorrhagia     4. Dysmenorrhea     5. Endometrial polyp     6. Graves' Disease         28 yo female - otherwise healthy - medically stable. Here for preop evaluation prior to hysterectomy - labs ordered and reviewed.  OK for surgery.    Subjective:     Scheduled Procedure: hysterectomy, da Vin  Surgery Date:  12/27/2017  Surgery Location:  [facility] - [place]  Surgeon:  Dr. JORGE Root    28 yo with large fibroids and uterine polyps - resulting in intense pain and heavy menstrual flow.  Has been evaluated by gynecology and felt to be a candidate for hysterectomy.      Current Outpatient Prescriptions   Medication Sig Dispense Refill     levothyroxine (SYNTHROID, LEVOTHROID) 100 MCG tablet Take 1 tablet (100 mcg total) by mouth daily. 30 tablet 3     levothyroxine (SYNTHROID, LEVOTHROID) 88 MCG tablet Take 1 tablet (88 mcg total) by mouth Daily at 6:00 am. TAKE 1 TABLET BY MOUTH ONCE DAILY.TAKE 1 TABLET BY MOUTH ONCE DAILY. 30 tablet 2     No current facility-administered medications for this visit.        No Known Allergies    Immunization History   Administered Date(s) Administered     Tdap 01/25/2010       Patient Active Problem List   Diagnosis     Graves' Disease     Menorrhagia     Endometrial polyp     Dysmenorrhea     Hypothyroidism, acquired     Vitamin D deficiency       History reviewed. No pertinent past medical history.    Social History     Social History     Marital status:      Spouse name: N/A     Number of children: N/A     Years of education: N/A     Occupational History     Not on file.     Social History Main Topics     Smoking status: Never Smoker     Smokeless tobacco: Never Used      Comment: no second hand smoke exposure     Alcohol use Not on file     Drug use: Not on file     Sexual activity:  "Not on file     Other Topics Concern     Not on file     Social History Narrative       History reviewed. No pertinent surgical history.    History of Present Illness  Recent Health  Fever: no  Chills: no  Fatigue: no  Chest Pain: no  Cough: no  Dyspnea: no  Urinary Frequency: no  Nausea: no  Vomiting: no  Diarrhea: no  Abdominal Pain: no  Easy Bruising: no  Lower Extremity Swelling: no  Poor Exercise Tolerance: no    Most recent Health Maintenance Visit:  11 month(s) ago    Pertinent History  Prior Anesthesia: no  Previous Anesthesia Reaction:  no  Diabetes: no  Cardiovascular Disease: no  Pulmonary Disease: no  Renal Disease: no  GI Disease: no  Sleep Apnea: no  Thromboembolic Problems: no  Clotting Disorder: no  Bleeding Disorder: no  Transfusion Reaction: no  Impaired Immunity: no  Steroid use in the last 6 months: no  Frequent Aspirin use: no    Family history of Stroke    Social history of patient does not wear denture or partial plates, the patient refuses transfusion, NSAID use and there are no concerns regarding care after surgery    After surgery, the patient plans to recover at home with family.    Review of Systems  Pertinent positives as noted in HPI; otherwise 12 point ROS negative.    alternating thyroid doses (100 and 88 mcg) - feels good      Objective:         Vitals:    12/05/17 0914   BP: 114/72   Pulse: 78   Resp: 14   Temp: 97.9  F (36.6  C)   TempSrc: Oral   SpO2: 99%   Weight: 167 lb (75.8 kg)   Height: 5' 3.75\" (1.619 m)       Physical Exam:  EXAM:  /72 (Patient Site: Right Arm, Patient Position: Sitting, Cuff Size: Adult Regular)  Pulse 78  Temp 97.9  F (36.6  C) (Oral)   Resp 14  Ht 5' 3.75\" (1.619 m)  Wt 167 lb (75.8 kg)  LMP 12/01/2017 (Exact Date)  SpO2 99%  BMI 28.89 kg/m2   Gen:  NAD, appears well, well-hydrated  HEENT:  TMs nl, oropharynx benign, nasal mucosa nl, conjunctiva clear  Neck:  Supple, no adenopathy, no thyromegaly, no carotid bruits, no JVD  Lungs:  Clear to " auscultation bilaterally  Cor:  RRR no murmur  Abd:  Soft, nontender, BS+, no masses, no guarding or rebound, no HSM  Extr:  Neg.  Neuro:  No asymmetry  Skin:  Warm/dry          Results for orders placed or performed in visit on 12/05/17   Hemoglobin   Result Value Ref Range    Hemoglobin 12.2 12.0 - 16.0 g/dL   Pregnancy (Beta-hCG, Qual), Urine   Result Value Ref Range    Pregnancy Test, Urine Negative Negative    Specific Gravity, UA 1.010 1.001 - 1.030

## 2021-06-16 PROBLEM — N84.0 ENDOMETRIAL POLYP: Status: ACTIVE | Noted: 2017-02-10

## 2021-06-16 PROBLEM — Z90.710 S/P HYSTERECTOMY: Status: ACTIVE | Noted: 2019-02-19

## 2021-06-16 PROBLEM — N92.0 MENORRHAGIA: Status: ACTIVE | Noted: 2017-10-29

## 2021-06-16 PROBLEM — N94.6 DYSMENORRHEA: Status: ACTIVE | Noted: 2017-02-10

## 2021-06-16 NOTE — TELEPHONE ENCOUNTER
Provider Refill Request  Medication:  Prozac, Synthroid  RN can NOT refill this medication per RN refill protocol because med is not covered by policy/route to provider

## 2021-06-16 NOTE — TELEPHONE ENCOUNTER
Reason for Call:  Medication or medication refill:    Do you use a [facility]?  Name of the pharmacy and phone number for the current request: [facility] [phone]    Name of the medication requested: levothyroxine 100mg  Other request: this is new to this pharmacy     Can we leave a detailed message on this number? Yes    Phone number patient can be reached at:   Cell number on file:    Telephone Information:   Mobile [phone]       Best Time: anytime    Call taken on 4/21/2021 at 9:57 AM by Dia Mooney

## 2021-06-24 NOTE — PROGRESS NOTES
"Assessment:      Healthy female exam.    1. Routine general medical examination at a health care facility  Gynecologic Cytology (PAP Smear)    Comprehensive Metabolic Panel    Lipid Profile    HPV High Risk DNA Cervical   2. Vitamin D deficiency  Vitamin D, Total (25-Hydroxy)   3. Graves' Disease     4. Hypothyroidism, acquired  Thyroid Stimulating Hormone (TSH)    T4, Free   5. S/P hysterectomy  Gynecologic Cytology (PAP Smear)    HPV High Risk DNA Cervical            Plan:      This is a 29 yo female here for physical exam:  1.  Health Maintenance - due for pap smear for cervical cancer screening - done today;   2.  Vitamin D deficiency - check levels - not currently using supplement but recalls that it has been quite low in past  3.  Graves Disease - treated - now with hypothyroidism - has been relatively stable on current dose - will recheck labs  4.  S/p hysterectomy - as above - pap smear done today - doesn't need \"routine\" pap smears - discussed with patient.     Subjective:      Beverly Wilder is a 28 y.o. female who presents for an annual exam.  The patient reports that there is not domestic violence in her life.     Here for physical  Feeling well - healthwise  More energy than she's ever had!  Feeling a little down - thought it might be thyroid - has had it tested recently  Taking Levothyroxine 100 mcg 5 days a week and 88 mcg 2 days a week (Tues/Thurs)          Healthy Habits:   Regular Exercise: was regularly working out until winter; currently 2 days/week  Sunscreen Use: Yes  Healthy Diet: Yes and lost 20 pounds this year with [employer] -   Dental Visits Regularly: Yes  Seat Belt: Yes  Sexually active: Yes  Self Breast Exam Monthly:irregularly      Immunization History   Administered Date(s) Administered     Tdap 01/25/2010     Immunization status: reviewed.    No exam data present    Gynecologic History  No LMP recorded. Patient has had a hysterectomy.  Contraception: none  Last Pap: 2012 " Results were: normal  Last mammogram: na. Results were: NA  (maternal aunt  of breast cancer at age 50s)      OB History   No data available       Current Outpatient Medications   Medication Sig Dispense Refill     levothyroxine (SYNTHROID, LEVOTHROID) 100 MCG tablet Take 1 tablet (100 mcg total) by mouth Daily at 6:00 am. You are overdue for an office visit for more refills;call  90 tablet 0     levothyroxine (SYNTHROID, LEVOTHROID) 88 MCG tablet Take 1 tablet (88 mcg total) by mouth every other day. Alternate with 100 mcg tablet. 90 tablet 0     No current facility-administered medications for this visit.      History reviewed. No pertinent past medical history.  History reviewed. No pertinent surgical history.  [place]old  History reviewed. No pertinent family history.  Social History     Socioeconomic History     Marital status:      Spouse name: Not on file     Number of children: Not on file     Years of education: Not on file     Highest education level: Not on file   Occupational History     Not on file   Social Needs     Financial resource strain: Not on file     Food insecurity:     Worry: Not on file     Inability: Not on file     Transportation needs:     Medical: Not on file     Non-medical: Not on file   Tobacco Use     Smoking status: Never Smoker     Smokeless tobacco: Never Used     Tobacco comment: no second hand smoke exposure   Substance and Sexual Activity     Alcohol use: Not on file     Drug use: Not on file     Sexual activity: Not on file   Lifestyle     Physical activity:     Days per week: Not on file     Minutes per session: Not on file     Stress: Not on file   Relationships     Social connections:     Talks on phone: Not on file     Gets together: Not on file     Attends Jewish service: Not on file     Active member of club or organization: Not on file     Attends meetings of clubs or organizations: Not on file     Relationship status: Not on file     Intimate partner  "violence:     Fear of current or ex partner: Not on file     Emotionally abused: Not on file     Physically abused: Not on file     Forced sexual activity: Not on file   Other Topics Concern     Not on file   Social History Narrative     Not on file       Review of Systems  Review of Systems     Pertinent positives as noted in HPI; otherwise 12 point ROS negative.        Objective:         Vitals:    02/19/19 0731   BP: 100/60   Pulse: 74   Resp: 20   Temp: 97.8  F (36.6  C)   TempSrc: Oral   SpO2: 98%   Weight: 148 lb 1 oz (67.2 kg)   Height: 5' 2.75\" (1.594 m)     Body mass index is 26.44 kg/m .    Physical  Physical Exam    EXAM:  /60 (Patient Site: Right Arm, Patient Position: Sitting, Cuff Size: Adult Regular)   Pulse 74   Temp 97.8  F (36.6  C) (Oral)   Resp 20   Ht 5' 2.75\" (1.594 m)   Wt 148 lb 1 oz (67.2 kg)   SpO2 98%   Breastfeeding? 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  Neuro:  No asymmetry, Nl motor tone/strength, nl sensation, reflexes =, gait nl, nl coordination, CN intact,   Skin:  Warm/dry        "

## 2021-10-16 ENCOUNTER — HEALTH MAINTENANCE LETTER (OUTPATIENT)
Age: 31
End: 2021-10-16

## 2022-10-01 ENCOUNTER — HEALTH MAINTENANCE LETTER (OUTPATIENT)
Age: 32
End: 2022-10-01

## 2023-02-05 ENCOUNTER — HEALTH MAINTENANCE LETTER (OUTPATIENT)
Age: 33
End: 2023-02-05

## 2024-03-03 ENCOUNTER — HEALTH MAINTENANCE LETTER (OUTPATIENT)
Age: 34
End: 2024-03-03